# Patient Record
(demographics unavailable — no encounter records)

---

## 2025-01-10 NOTE — HISTORY OF PRESENT ILLNESS
[FreeTextEntry1] : 64f hx breast ca '06, HTN, HLD presenting for colon cancer screening. Pt denies abdominal pain, rectal bleeding, change in bowel habits, or unexplained weight loss.    Also notes heartburn frequently - takes otcs w/o any regularity but w/ triggers tomatoes, EtOH, chocolate.  No dysphagia.  Last colonoscopy: 4/2013 w/ Dr. Perdomo - small hyperplastic polyp   Soc:  no tobacco or significant EtOH FHx: no FHx GI malignancy or IBD  ROS: Constitutional:: no weight loss, fevers ENT: no deafness Eyes: not blind Neck: no LN Chest: no dyspnea/cough Cardiac: no chest pain Vascular: no leg swelling GI: no abdominal pain, nausea, vomiting, diarrhea, constipation, rectal bleeding, dysphagia, melena unless otherwise noted in HPI : no dysuria, dark urine Skin: no rashes, jaundice Heme: no bleeding Endocrine: no DM unless otherwise stated in HPI  Px: (VS noted below) General: NAD Eyes: anicteric Oropharynx:  clear Neck: no LN Chest: normal respiratory effort CVS: regular Abd: soft, NT, ND, +BS, no HSM Ext: no atrophy Neuro: grossly nonfocal  Labs/imaging/prior endoscopic results reviewed to the extent available and noted in HPI

## 2025-01-10 NOTE — ASSESSMENT
[FreeTextEntry1] : Colon cancer screening - colonoscopy due - Colonoscopy scheduled - Risks, benefits, alternatives were discussed, including but not limited to bleeding, infection, perforation and sedation risks. Additionally, the possibility of missed lesions was conveyed.  GERD - dietary and lifestyle modifications, including weight loss, smaller/frequent/earlier (>3h prior to lying down) meals, trials of cutting down/out caffeine, chocolate, tomatoes, fatty foods, alcohol. EGD in light of age, GERD sx, .  PMD/consultation/hospital notes and Labs/imaging/prior endoscopic results reviewed to extent noted in HPI; and, if procedure code billed on this visit for lab draw, this serves to signify that labs were drawn here in this office. Pt also advised that any studies ordered, if prior authorization required it may take up to a week to confirm - and if pt has not heard RE: scheduling by a week, they should call this office.

## 2025-01-10 NOTE — CONSULT LETTER
[FreeTextEntry1] : Dear Dr. BERTHA HERNANDEZ ,  I had the pleasure of evaluating your patient,  JENELLE HUMPHRIES.  Please refer to my note below.  Thank you very much for allowing me to participate in the care of this patient.  If you have any questions, please do not hesitate to contact me.  Sincerely,   Devon Alicia MD

## 2025-01-10 NOTE — REASON FOR VISIT
[Consultation] : a consultation visit [FreeTextEntry1] : Kindly asked by Dr. Rubin to consult and evaluate patient for      colon screening               A copy of this note is being sent to physician requesting consultation.

## 2025-01-16 NOTE — PHYSICAL EXAM
[Normocephalic] : normocephalic [Atraumatic] : atraumatic [Supple] : supple [No Supraclavicular Adenopathy] : no supraclavicular adenopathy [No Thyromegaly] : no thyromegaly [Normal Sinus Rhythm] : normal sinus rhythm [Examined in the supine and seated position] : examined in the supine and seated position [No dominant masses] : no dominant masses in right breast  [No dominant masses] : no dominant masses left breast [No Nipple Retraction] : no left nipple retraction [No Nipple Discharge] : no left nipple discharge [No Axillary Lymphadenopathy] : no left axillary lymphadenopathy [No Edema] : no edema [No Rashes] : no rashes [No Ulceration] : no ulceration [de-identified] : +<1cm L Post Inf Cx LN > observe [de-identified] : S/P MX/SLN/Ax/PMRT/LD w/o rec. %. No lymphedema.  [de-identified] : +FC tissue NSF

## 2025-01-16 NOTE — HISTORY OF PRESENT ILLNESS
[FreeTextEntry1] : Pt had sig episode derpession/anx () > on Effexor/Prozac/Ativan > helping. S/P R MX/Ax/Implt (06)([place]): +8.5cm ILCA, + LN, -margins, ER+, [place]+, Her2 - R Stage IIIC (T3N3M0) ILCA S/P chemo ([place]) S/P PMRT BRCA (Myriad compr)(): - S/P tmx x 5 yrs Started FemaralASAb () S/P R LD (19)([place]) +recent elevated tumor markers ([place]) > EOD w/u - > Ayaka sent > p Dx'ed w/ HT > started olmasartan (3/18)([place]) Lost 16 lbs ([employer]) Father  (3/21)(85yo, COVID) Got !! Colonoscopy(): "WNL" > 5yrs  PAP/Pelvic (): "WNL"  Had COVID () > recovered Got Pfizer booster ()(LUE) No other MH/FH changes. ROS reviewed/discussed. Taking Vit D. Last Bone Densitometry (): +osteopenia L Mammo/L Sono (24): FG, NSF CT C/A/P (3/14/24): +3cm R apical opacity c/w scar PET (24): +R apical opacity w/o uptake

## 2025-05-06 NOTE — ASSESSMENT
[FreeTextEntry1] : # +8.5cm ILCA, +14/17 LN, -margins, ER+, [place]+, Her2 - R Stage IIIC (T3N3M0) ILCA s/p R mastectomy with implant reconstruction with Dr. Dennis then s/p R LD 2/25/19 by Dr. Rebollar s/p chemo and RT followed by Dr. Reis.  Following completion of chemo/RT she completed 5 years of endocrine therapy with Tamoxifen and then transitioned to Letrozole and remains on. L Mammo/L Sono (5/30/24) MRI brain negative for intracranial mets but ? calvarial lesions tumor markers rising - no recent imaging. Check PET CT also will check signaterra to look for circulating tumor DNA continue follow up with Dr. Waldrop  #esophagitis/ reflux needs repeat EGD Cont. PPI  #osteopenia continue ca++ and vit D continue weight bearing exercise limit alcohol maintain healthy BMI 3/2025 - Femoral neck T score -1.9, Total hip t score -0.5, L spine T score -1.3  Tele in 3 weeks to review pet ct and labs

## 2025-05-06 NOTE — HISTORY OF PRESENT ILLNESS
[de-identified] :  Ms. Park is a 64 year old female with a PMHx of anxiety and depression that presents for consultation referred by breast surgery for hx of breast cancer 18 years ago.   Oncological History:   2006 s/p R mastectomy with implant reconstruction with Dr. Dennis +8.5cm ILCA, + LN, -margins, ER+, [place]+, Her2 - R Stage IIIC (T3N3M0) ILCA  s/p chemo and RT followed by Dr. Reis. Following completion of chemo/RT she completed 5 years of endocrine therapy with Tamoxifen and then transitioned to Letrozole and remains on.  s/p Myriad genetic testing  negative has not had more updated testing   S/P R LD (19)([place])  +recent elevated tumor markers  Ca15.3 - 24 --> 37.4 --> 70.9  Ca 27.29 74--> 91.2--> 170.8 Alk p 120 AST 43  2025 [facility] CTC HER2 report revealing 4.68% CTCs HER2 Pos (3+), 79.1 CTCs HER 2 Low (1+)  - results consistent with HER2 positive   MRI brain 2025 no evidence of intraparenchymal mets. No accute intracranial hemorrhage or acute infarct. Few scattered nonspecific subcentimeter enhancements in the calvarium. Given hx of breast cancer osseous mets not definitely excluded. Consider bone scan for further eval an short term follow up MRI exam recommended to ensure stability.  Health Maintenance:  Colonoscopy  and most recent  with Dr. Alicia  PAP/Pelvic  Last Bone Densitometry  +osteopenia L Mammo/L Sono 24 CT C/A/P 3/14/24 +3cm R apical opacity c/w scar PET 24+R apical opacity w/o uptake  Breast Cancer Risk Factors: Menarche: 13  Date of LMP: 46  Menopause: 46  Age at first live birth: 29 Nursed: n Hysterectomy: n Oophorectomy: n OCP: y HRT: n Last pap/pelvic exam:  Related family history no fam hx of cancer  Ashkenazi: n BRCA testin negative

## 2025-05-06 NOTE — PHYSICAL EXAM
[Fully active, able to carry on all pre-disease performance without restriction] : Status 0 - Fully active, able to carry on all pre-disease performance without restriction [Normal] : affect appropriate [de-identified] : no masses or adenopathy palpated bilaterally

## 2025-05-06 NOTE — HISTORY OF PRESENT ILLNESS
[de-identified] :  Ms. Park is a 64 year old female with a PMHx of anxiety and depression that presents for consultation referred by breast surgery for hx of breast cancer 18 years ago.   Oncological History:   2006 s/p R mastectomy with implant reconstruction with Dr. Dennis +8.5cm ILCA, + LN, -margins, ER+, CT+, Her2 - R Stage IIIC (T3N3M0) ILCA  s/p chemo and RT followed by Dr. Reis. Following completion of chemo/RT she completed 5 years of endocrine therapy with Tamoxifen and then transitioned to Letrozole and remains on.  s/p Myriad genetic testing  negative has not had more updated testing   S/P R LD (19)([place])  +recent elevated tumor markers  Ca15.3 - 24 --> 37.4 --> 70.9  Ca 27.29 74--> 91.2--> 170.8 Alk p 120 AST 43  2025 [facility] CTC HER2 report revealing 4.68% CTCs HER2 Pos (3+), 79.1 CTCs HER 2 Low (1+)  - results consistent with HER2 positive   MRI brain 2025 no evidence of intraparenchymal mets. No accute intracranial hemorrhage or acute infarct. Few scattered nonspecific subcentimeter enhancements in the calvarium. Given hx of breast cancer osseous mets not definitely excluded. Consider bone scan for further eval an short term follow up MRI exam recommended to ensure stability.  Health Maintenance:  Colonoscopy  and most recent  with Dr. Alicia  PAP/Pelvic  Last Bone Densitometry  +osteopenia L Mammo/L Sono 24 CT C/A/P 3/14/24 +3cm R apical opacity c/w scar PET 24+R apical opacity w/o uptake  Breast Cancer Risk Factors: Menarche: 13  Date of LMP: 46  Menopause: 46  Age at first live birth: 29 Nursed: n Hysterectomy: n Oophorectomy: n OCP: y HRT: n Last pap/pelvic exam:  Related family history no fam hx of cancer  Ashkenazi: n BRCA testin negative

## 2025-05-06 NOTE — ASSESSMENT
[FreeTextEntry1] : # +8.5cm ILCA, +14/17 LN, -margins, ER+, [place]+, Her2 - R Stage IIIC (T3N3M0) ILCA s/p R mastectomy with implant reconstruction with Dr. Dennis then s/p R LD 2/25/19 by Dr. Rebolalr s/p chemo and RT followed by Dr. Reis.  Following completion of chemo/RT she completed 5 years of endocrine therapy with Tamoxifen and then transitioned to Letrozole and remains on. L Mammo/L Sono (5/30/24) MRI brain negative for intracranial mets but ? calvarial lesions tumor markers rising - no recent imaging. Check PET CT also will check signaterra to look for circulating tumor DNA continue follow up with Dr. Waldrop  #esophagitis/ reflux needs repeat EGD Cont. PPI  #osteopenia continue ca++ and vit D continue weight bearing exercise limit alcohol maintain healthy BMI 3/2025 - Femoral neck T score -1.9, Total hip t score -0.5, L spine T score -1.3  Tele in 3 weeks to review pet ct and labs

## 2025-05-06 NOTE — PHYSICAL EXAM
[Fully active, able to carry on all pre-disease performance without restriction] : Status 0 - Fully active, able to carry on all pre-disease performance without restriction [Normal] : affect appropriate [de-identified] : no masses or adenopathy palpated bilaterally

## 2025-06-11 NOTE — ASSESSMENT
[FreeTextEntry1] : # +8.5cm ILCA, +14/17 LN, -margins, ER+, [place]+, Her2 - R Stage IIIC (T3N3M0) ILCA s/p R mastectomy with implant reconstruction with Dr. Dennis then s/p R LD 2/25/19 by Dr. Rebollar s/p chemo and RT followed by Dr. Reis.  Following completion of chemo/RT she completed 5 years of endocrine therapy with Tamoxifen and then transitioned to Letrozole and was on that since. L Mammo/L Sono (5/30/24) MRI brain negative for intracranial mets but ? calvarial lesions tumor markers rising - no recent imaging.  PET CT - Innumerable bone metastases, mostly involving the ribs, spine and pelvis, but also including the scapulae and the proximal femurs.  There is relatively little correlative abnormality on the accompanying CT scan although some lesions appear to be lytic. plan for ribociclib and fulvestrant as she was previously on letrozole. also will need xgeva reviewed side effect profile of all medications. she expresses understanding.  dental clearance prior to xgeva. she is seeing them on 6/30 check guardant 360 to look for ESR1 mutation check foundation to look for targetable mutations  #esophagitis/ reflux needs repeat EGD Cont. PPI  #osteopenia continue ca++ and vit D continue weight bearing exercise limit alcohol maintain healthy BMI 3/2025 - Femoral neck T score -1.9, Total hip t score -0.5, L spine T score -1.3 will be on xgeva for bone mets

## 2025-06-11 NOTE — HISTORY OF PRESENT ILLNESS
[de-identified] :  Ms. Park is a 64 year old female with a PMHx of anxiety and depression that presents for consultation referred by breast surgery for hx of breast cancer 18 years ago.   Oncological History:   2006 s/p R mastectomy with implant reconstruction with Dr. Dennis +8.5cm ILCA, + LN, -margins, ER+, [place]+, Her2 - R Stage IIIC (T3N3M0) ILCA  s/p chemo and RT followed by Dr. Reis. Following completion of chemo/RT she completed 5 years of endocrine therapy with Tamoxifen and then transitioned to Letrozole and remains on.  s/p Myriad genetic testing  negative has not had more updated testing   S/P R LD (19)([place])  +recent elevated tumor markers  Ca15.3 - 24 --> 37.4 --> 70.9  Ca 27.29 74--> 91.2--> 170.8 Alk p 120 AST 43  2025 [facility] CTC HER2 report revealing 4.68% CTCs HER2 Pos (3+), 79.1 CTCs HER 2 Low (1+)  - results consistent with HER2 positive   MRI brain 2025 no evidence of intraparenchymal mets. No accute intracranial hemorrhage or acute infarct. Few scattered nonspecific subcentimeter enhancements in the calvarium. Given hx of breast cancer osseous mets not definitely excluded. Consider bone scan for further eval an short term follow up MRI exam recommended to ensure stability.  Health Maintenance:  Colonoscopy  and most recent  with Dr. Alicia  PAP/Pelvic  Last Bone Densitometry  +osteopenia L Mammo/L Sono 24 CT C/A/P 3/14/24 +3cm R apical opacity c/w scar PET 24+R apical opacity w/o uptake  Breast Cancer Risk Factors: Menarche: 13  Date of LMP: 46  Menopause: 46  Age at first live birth: 29 Nursed: n Hysterectomy: n Oophorectomy: n OCP: y HRT: n Last pap/pelvic exam:  Related family history no fam hx of cancer  Ashkenazi: n BRCA testin negative  [de-identified] : s/p biopsy revealing ER/[place]+ HER2- metastatic disease to bones no complaints of pain

## 2025-06-11 NOTE — CONSULT LETTER
[Dear  ___] : Dear  [unfilled], [Please see my note below.] : Please see my note below. [Consult Letter:] : I had the pleasure of evaluating your patient, [unfilled]. [Consult Closing:] : Thank you very much for allowing me to participate in the care of this patient.  If you have any questions, please do not hesitate to contact me. [Sincerely,] : Sincerely, [FreeTextEntry3] : Yumiko Hall DO, FACGRISEL, FACP Medical Oncology and Hematology [facility]

## 2025-06-11 NOTE — HISTORY OF PRESENT ILLNESS
[de-identified] :  Ms. Park is a 64 year old female with a PMHx of anxiety and depression that presents for consultation referred by breast surgery for hx of breast cancer 18 years ago.   Oncological History:   2006 s/p R mastectomy with implant reconstruction with Dr. Dennis +8.5cm ILCA, + LN, -margins, ER+, [place]+, Her2 - R Stage IIIC (T3N3M0) ILCA  s/p chemo and RT followed by Dr. Reis. Following completion of chemo/RT she completed 5 years of endocrine therapy with Tamoxifen and then transitioned to Letrozole and remains on.  s/p Myriad genetic testing  negative has not had more updated testing   S/P R LD (19)([place])  +recent elevated tumor markers  Ca15.3 - 24 --> 37.4 --> 70.9  Ca 27.29 74--> 91.2--> 170.8 Alk p 120 AST 43  2025 [facility] CTC HER2 report revealing 4.68% CTCs HER2 Pos (3+), 79.1 CTCs HER 2 Low (1+)  - results consistent with HER2 positive   MRI brain 2025 no evidence of intraparenchymal mets. No accute intracranial hemorrhage or acute infarct. Few scattered nonspecific subcentimeter enhancements in the calvarium. Given hx of breast cancer osseous mets not definitely excluded. Consider bone scan for further eval an short term follow up MRI exam recommended to ensure stability.  Health Maintenance:  Colonoscopy  and most recent  with Dr. Alicia  PAP/Pelvic  Last Bone Densitometry  +osteopenia L Mammo/L Sono 24 CT C/A/P 3/14/24 +3cm R apical opacity c/w scar PET 24+R apical opacity w/o uptake  Breast Cancer Risk Factors: Menarche: 13  Date of LMP: 46  Menopause: 46  Age at first live birth: 29 Nursed: n Hysterectomy: n Oophorectomy: n OCP: y HRT: n Last pap/pelvic exam:  Related family history no fam hx of cancer  Ashkenazi: n BRCA testin negative  [de-identified] : s/p biopsy revealing ER/LA+ HER2- metastatic disease to bones no complaints of pain

## 2025-06-11 NOTE — PHYSICAL EXAM
[Fully active, able to carry on all pre-disease performance without restriction] : Status 0 - Fully active, able to carry on all pre-disease performance without restriction [Normal] : affect appropriate [de-identified] : no masses or adenopathy palpated bilaterally

## 2025-06-11 NOTE — ASSESSMENT
[FreeTextEntry1] : # +8.5cm ILCA, +14/17 LN, -margins, ER+, [place]+, Her2 - R Stage IIIC (T3N3M0) ILCA s/p R mastectomy with implant reconstruction with Dr. Dennis then s/p R LD 2/25/19 by Dr. Rebollar s/p chemo and RT followed by Dr. Reis.  Following completion of chemo/RT she completed 5 years of endocrine therapy with Tamoxifen and then transitioned to Letrozole and was on that since. L Mammo/L Sono (5/30/24) MRI brain negative for intracranial mets but ? calvarial lesions tumor markers rising - no recent imaging.  PET CT - Innumerable bone metastases, mostly involving the ribs, spine and pelvis, but also including the scapulae and the proximal femurs.  There is relatively little correlative abnormality on the accompanying CT scan although some lesions appear to be lytic. plan for ribociclib and fulvestrant as she was previously on letrozole. also will need xgeva reviewed side effect profile of all medications. she expresses understanding.  dental clearance prior to xgeva. she is seeing them on 6/30 check guardant 360 to look for ESR1 mutation check foundation to look for targetable mutations  #esophagitis/ reflux needs repeat EGD Cont. PPI  #osteopenia continue ca++ and vit D continue weight bearing exercise limit alcohol maintain healthy BMI 3/2025 - Femoral neck T score -1.9, Total hip t score -0.5, L spine T score -1.3 will be on xgeva for bone mets

## 2025-06-11 NOTE — PHYSICAL EXAM
[Fully active, able to carry on all pre-disease performance without restriction] : Status 0 - Fully active, able to carry on all pre-disease performance without restriction [Normal] : affect appropriate [de-identified] : no masses or adenopathy palpated bilaterally

## 2025-06-11 NOTE — ASSESSMENT
[FreeTextEntry1] : # +8.5cm ILCA, +14/17 LN, -margins, ER+, [place]+, Her2 - R Stage IIIC (T3N3M0) ILCA s/p R mastectomy with implant reconstruction with Dr. Dennis then s/p R LD 2/25/19 by Dr. Rebollar s/p chemo and RT followed by Dr. Reis.  Following completion of chemo/RT she completed 5 years of endocrine therapy with Tamoxifen and then transitioned to Letrozole and was on that since. L Mammo/L Sono (5/30/24) MRI brain negative for intracranial mets but ? calvarial lesions tumor markers rising - no recent imaging.  PET CT - Innumerable bone metastases, mostly involving the ribs, spine and pelvis, but also including the scapulae and the proximal femurs.  There is relatively little correlative abnormality on the accompanying CT scan although some lesions appear to be lytic. plan for ribociclib and fulvestrant as she was previously on letrozole. also will need xgeva reviewed side effect profile of all medications. she expresses understanding.  dental clearance prior to xgeva. she is seeing them on 6/30 check guardant 360 to look for ESR1 mutation check foundation to look for targetable mutations  #esophagitis/ reflux needs repeat EGD Cont. PPI  #osteopenia continue ca++ and vit D continue weight bearing exercise limit alcohol maintain healthy BMI 3/2025 - Femoral neck T score -1.9, Total hip t score -0.5, L spine T score -1.3 will be on xgeva for bone mets

## 2025-06-11 NOTE — CONSULT LETTER
[Dear  ___] : Dear  [unfilled], [Consult Letter:] : I had the pleasure of evaluating your patient, [unfilled]. [Please see my note below.] : Please see my note below. [Consult Closing:] : Thank you very much for allowing me to participate in the care of this patient.  If you have any questions, please do not hesitate to contact me. [Sincerely,] : Sincerely, [FreeTextEntry3] : Yumiko Hall DO, FACGRISEL, FACP Medical Oncology and Hematology [facility]

## 2025-06-11 NOTE — HISTORY OF PRESENT ILLNESS
[de-identified] :  Ms. Park is a 64 year old female with a PMHx of anxiety and depression that presents for consultation referred by breast surgery for hx of breast cancer 18 years ago.   Oncological History:   2006 s/p R mastectomy with implant reconstruction with Dr. Dennis +8.5cm ILCA, + LN, -margins, ER+, [place]+, Her2 - R Stage IIIC (T3N3M0) ILCA  s/p chemo and RT followed by Dr. Reis. Following completion of chemo/RT she completed 5 years of endocrine therapy with Tamoxifen and then transitioned to Letrozole and remains on.  s/p Myriad genetic testing  negative has not had more updated testing   S/P R LD (19)([place])  +recent elevated tumor markers  Ca15.3 - 24 --> 37.4 --> 70.9  Ca 27.29 74--> 91.2--> 170.8 Alk p 120 AST 43  2025 [facility] CTC HER2 report revealing 4.68% CTCs HER2 Pos (3+), 79.1 CTCs HER 2 Low (1+)  - results consistent with HER2 positive   MRI brain 2025 no evidence of intraparenchymal mets. No accute intracranial hemorrhage or acute infarct. Few scattered nonspecific subcentimeter enhancements in the calvarium. Given hx of breast cancer osseous mets not definitely excluded. Consider bone scan for further eval an short term follow up MRI exam recommended to ensure stability.  Health Maintenance:  Colonoscopy  and most recent  with Dr. Alicia  PAP/Pelvic  Last Bone Densitometry  +osteopenia L Mammo/L Sono 24 CT C/A/P 3/14/24 +3cm R apical opacity c/w scar PET 24+R apical opacity w/o uptake  Breast Cancer Risk Factors: Menarche: 13  Date of LMP: 46  Menopause: 46  Age at first live birth: 29 Nursed: n Hysterectomy: n Oophorectomy: n OCP: y HRT: n Last pap/pelvic exam:  Related family history no fam hx of cancer  Ashkenazi: n BRCA testin negative  [de-identified] : s/p biopsy revealing ER/[place]+ HER2- metastatic disease to bones no complaints of pain

## 2025-06-11 NOTE — CONSULT LETTER
[Dear  ___] : Dear  [unfilled], [Please see my note below.] : Please see my note below. [Consult Letter:] : I had the pleasure of evaluating your patient, [unfilled]. [Consult Closing:] : Thank you very much for allowing me to participate in the care of this patient.  If you have any questions, please do not hesitate to contact me. [Sincerely,] : Sincerely, [FreeTextEntry3] : Yumiko Hall DO, FACGRISEL, FACP Medical Oncology and Hematology [facility]

## 2025-06-11 NOTE — PHYSICAL EXAM
[Fully active, able to carry on all pre-disease performance without restriction] : Status 0 - Fully active, able to carry on all pre-disease performance without restriction [Normal] : affect appropriate [de-identified] : no masses or adenopathy palpated bilaterally

## 2025-06-25 NOTE — BEGINNING OF VISIT
[0] : 2) Feeling down, depressed, or hopeless: Not at all (0) [[id number]] : 0 [Never] : Never [Date Discussed (MM/DD/YY): ___] : Discussed: [unfilled] [Patient/Caregiver not ready to engage] : Patient/Caregiver not ready to engage

## 2025-06-25 NOTE — BEGINNING OF VISIT
[0] : 2) Feeling down, depressed, or hopeless: Not at all (0) [[id number]] : 0 [Never] : Never [Date Discussed (MM/DD/YY): ___] : Discussed: [unfilled] [Patient/Caregiver not ready to engage] : Patient/Caregiver not ready to engage

## 2025-06-25 NOTE — BEGINNING OF VISIT
[0] : 2) Feeling down, depressed, or hopeless: Not at all (0) [[id number]] : 0 [Never] : Never [Date Discussed (MM/DD/YY): ___] : Discussed: [unfilled] [Patient/Caregiver not ready to engage] : Patient/Caregiver not ready to engage

## 2025-06-25 NOTE — BEGINNING OF VISIT
[0] : 2) Feeling down, depressed, or hopeless: Not at all (0) [[id number]] : 0 [Never] : Never [Date Discussed (MM/DD/YY): ___] : Discussed: [unfilled] [Patient/Caregiver not ready to engage] : Patient/Caregiver not ready to engage

## 2025-07-01 NOTE — PHYSICAL EXAM
[Fully active, able to carry on all pre-disease performance without restriction] : Status 0 - Fully active, able to carry on all pre-disease performance without restriction [Normal] : affect appropriate [de-identified] : no masses or adenopathy palpated bilaterally

## 2025-07-01 NOTE — ASSESSMENT
[FreeTextEntry1] : # +8.5cm ILCA, +14/17 LN, -margins, ER+, [place]+, Her2 - R Stage IIIC (T3N3M0) ILCA s/p R mastectomy with implant reconstruction with Dr. Dennis then s/p R LD 2/25/19 by Dr. Rebollar s/p chemo and RT followed by Dr. Reis.  Following completion of chemo/RT she completed 5 years of endocrine therapy with Tamoxifen and then transitioned to Letrozole and was on that since. L Mammo/L Sono (5/30/24) MRI brain negative for intracranial mets but ? calvarial lesions tumor markers rising - no recent imaging.  PET CT - Innumerable bone metastases, mostly involving the ribs, spine and pelvis, but also including the scapulae and the proximal femurs.  There is relatively little correlative abnormality on the accompanying CT scan although some lesions appear to be lytic. plan for ribociclib and fulvestrant as she was previously on letrozole. also will need xgeva reviewed side effect profile of all medications. she expresses understanding.  dental clearance prior to xgeva. she is seeing them on 6/30 + ESR1 mutation and PI3KCA  If POD can try PI3KCA inbitor with alpelisib or capivasertib + fulvestrant or elacestrant to target ESR1 spoke with Dr. Reis to update him on case. She will be seeing him for his opinion today  #esophagitis/ reflux needs repeat EGD Cont. PPI  #osteopenia continue ca++ and vit D continue weight bearing exercise limit alcohol maintain healthy BMI 3/2025 - Femoral neck T score -1.9, Total hip t score -0.5, L spine T score -1.3 will be on xgeva for bone mets  #cancer related pain will have RT evaluate for radiation to ribs continue pain control as needed  RTC in 2 weeks cbc with diff, cmp, iron, ferritin, b12, folate, esr, crp, LDH, signatera

## 2025-07-01 NOTE — CONSULT LETTER
[Dear  ___] : Dear  [unfilled], [Consult Letter:] : I had the pleasure of evaluating your patient, [unfilled]. [Please see my note below.] : Please see my note below. [Consult Closing:] : Thank you very much for allowing me to participate in the care of this patient.  If you have any questions, please do not hesitate to contact me. [Sincerely,] : Sincerely, [FreeTextEntry3] : Yumiko Hall DO, FACGRISEL, FACP Medical Oncology and Hematology [facility]

## 2025-07-01 NOTE — HISTORY OF PRESENT ILLNESS
[de-identified] :  Ms. Park is a 64 year old female with a PMHx of anxiety and depression that presents for consultation referred by breast surgery for hx of breast cancer 18 years ago.   Oncological History:   2006 s/p R mastectomy with implant reconstruction with Dr. Dennis +8.5cm ILCA, + LN, -margins, ER+, [place]+, Her2 - R Stage IIIC (T3N3M0) ILCA  s/p chemo and RT followed by Dr. Reis. Following completion of chemo/RT she completed 5 years of endocrine therapy with Tamoxifen and then transitioned to Letrozole and remains on.  s/p Myriad genetic testing  negative has not had more updated testing   S/P R LD (19)([place])  +recent elevated tumor markers  Ca15.3 - 24 --> 37.4 --> 70.9  Ca 27.29 74--> 91.2--> 170.8 Alk p 120 AST 43  2025 [facility] CTC HER2 report revealing 4.68% CTCs HER2 Pos (3+), 79.1 CTCs HER 2 Low (1+)  - results consistent with HER2 positive   MRI brain 2025 no evidence of intraparenchymal mets. No accute intracranial hemorrhage or acute infarct. Few scattered nonspecific subcentimeter enhancements in the calvarium. Given hx of breast cancer osseous mets not definitely excluded. Consider bone scan for further eval an short term follow up MRI exam recommended to ensure stability.  Health Maintenance:  Colonoscopy  and most recent  with Dr. Alicia  PAP/Pelvic  Last Bone Densitometry  +osteopenia L Mammo/L Sono 24 CT C/A/P 3/14/24 +3cm R apical opacity c/w scar PET 24+R apical opacity w/o uptake  Breast Cancer Risk Factors: Menarche: 13  Date of LMP: 46  Menopause: 46  Age at first live birth: 29 Nursed: n Hysterectomy: n Oophorectomy: n OCP: y HRT: n Last pap/pelvic exam:  Related family history no fam hx of cancer  Ashkenazi: n BRCA testin negative  [de-identified] : s/p biopsy revealing ER/SD+ HER2- metastatic disease to bones complains of rib pain started ribociclib. continues to receive fulvestrant

## 2025-07-01 NOTE — PHYSICAL EXAM
[Fully active, able to carry on all pre-disease performance without restriction] : Status 0 - Fully active, able to carry on all pre-disease performance without restriction [Normal] : affect appropriate [de-identified] : no masses or adenopathy palpated bilaterally

## 2025-07-01 NOTE — ASSESSMENT
[FreeTextEntry1] : # +8.5cm ILCA, +14/17 LN, -margins, ER+, [place]+, Her2 - R Stage IIIC (T3N3M0) ILCA s/p R mastectomy with implant reconstruction with Dr. Dennis then s/p R LD 2/25/19 by Dr. Rebollar s/p chemo and RT followed by Dr. Reis.  Following completion of chemo/RT she completed 5 years of endocrine therapy with Tamoxifen and then transitioned to Letrozole and was on that since. L Mammo/L Sono (5/30/24) MRI brain negative for intracranial mets but ? calvarial lesions tumor markers rising - no recent imaging.  PET CT - Innumerable bone metastases, mostly involving the ribs, spine and pelvis, but also including the scapulae and the proximal femurs.  There is relatively little correlative abnormality on the accompanying CT scan although some lesions appear to be lytic. plan for ribociclib and fulvestrant as she was previously on letrozole. also will need xgeva reviewed side effect profile of all medications. she expresses understanding.  dental clearance prior to xgeva. she is seeing them on 6/30 + ESR1 mutation and PI3KCA  If POD can try PI3KCA inbitor with alpelisib or capivasertib + fulvestrant or elacestrant to target ESR1 spoke with Dr. Reis to update him on case. She will be seeing him for his opinion today  #esophagitis/ reflux needs repeat EGD Cont. PPI  #osteopenia continue ca++ and vit D continue weight bearing exercise limit alcohol maintain healthy BMI 3/2025 - Femoral neck T score -1.9, Total hip t score -0.5, L spine T score -1.3 will be on xgeva for bone mets  #cancer related pain will have RT evaluate for radiation to ribs continue pain control as needed  RTC in 2 weeks cbc with diff, cmp, iron, ferritin, b12, folate, esr, crp, LDH, signatera

## 2025-07-01 NOTE — PHYSICAL EXAM
[Fully active, able to carry on all pre-disease performance without restriction] : Status 0 - Fully active, able to carry on all pre-disease performance without restriction [Normal] : affect appropriate [de-identified] : no masses or adenopathy palpated bilaterally

## 2025-07-01 NOTE — ASSESSMENT
[FreeTextEntry1] : # +8.5cm ILCA, +14/17 LN, -margins, ER+, [place]+, Her2 - R Stage IIIC (T3N3M0) ILCA s/p R mastectomy with implant reconstruction with Dr. Dennis then s/p R LD 2/25/19 by Dr. Rebollar s/p chemo and RT followed by Dr. Reis.  Following completion of chemo/RT she completed 5 years of endocrine therapy with Tamoxifen and then transitioned to Letrozole and was on that since. L Mammo/L Sono (5/30/24) MRI brain negative for intracranial mets but ? calvarial lesions tumor markers rising - no recent imaging.  PET CT - Innumerable bone metastases, mostly involving the ribs, spine and pelvis, but also including the scapulae and the proximal femurs.  There is relatively little correlative abnormality on the accompanying CT scan although some lesions appear to be lytic. plan for ribociclib and fulvestrant as she was previously on letrozole. also will need xgeva reviewed side effect profile of all medications. she expresses understanding.  dental clearance prior to xgeva. she is seeing them on 6/30 + ESR1 mutation and PI3KCA  If POD can try PI3KCA inbitor with alpelisib or capivasertib + fulvestrant or elacestrant to target ESR1 spoke with Dr. Reis to update him on case. She will be seeing him for his opinion today  #esophagitis/ reflux needs repeat EGD Cont. PPI  #osteopenia continue ca++ and vit D continue weight bearing exercise limit alcohol maintain healthy BMI 3/2025 - Femoral neck T score -1.9, Total hip t score -0.5, L spine T score -1.3 will be on xgeva for bone mets  #cancer related pain will have RT evaluate for radiation to ribs continue pain control as needed  RTC in 2 weeks cbc with diff, cmp, iron, ferritin, b12, folate, esr, crp, LDH, signatera

## 2025-07-01 NOTE — CONSULT LETTER
[Dear  ___] : Dear  [unfilled], [Consult Letter:] : I had the pleasure of evaluating your patient, [unfilled]. [Please see my note below.] : Please see my note below. [Consult Closing:] : Thank you very much for allowing me to participate in the care of this patient.  If you have any questions, please do not hesitate to contact me. [Sincerely,] : Sincerely, [FreeTextEntry3] : Yumiko Hall DO, FACGRISEL, FACP Medical Oncology and Hematology [facility]

## 2025-07-01 NOTE — HISTORY OF PRESENT ILLNESS
[de-identified] :  Ms. Park is a 64 year old female with a PMHx of anxiety and depression that presents for consultation referred by breast surgery for hx of breast cancer 18 years ago.   Oncological History:   2006 s/p R mastectomy with implant reconstruction with Dr. Dennis +8.5cm ILCA, + LN, -margins, ER+, [place]+, Her2 - R Stage IIIC (T3N3M0) ILCA  s/p chemo and RT followed by Dr. Reis. Following completion of chemo/RT she completed 5 years of endocrine therapy with Tamoxifen and then transitioned to Letrozole and remains on.  s/p Myriad genetic testing  negative has not had more updated testing   S/P R LD (19)([place])  +recent elevated tumor markers  Ca15.3 - 24 --> 37.4 --> 70.9  Ca 27.29 74--> 91.2--> 170.8 Alk p 120 AST 43  2025 [facility] CTC HER2 report revealing 4.68% CTCs HER2 Pos (3+), 79.1 CTCs HER 2 Low (1+)  - results consistent with HER2 positive   MRI brain 2025 no evidence of intraparenchymal mets. No accute intracranial hemorrhage or acute infarct. Few scattered nonspecific subcentimeter enhancements in the calvarium. Given hx of breast cancer osseous mets not definitely excluded. Consider bone scan for further eval an short term follow up MRI exam recommended to ensure stability.  Health Maintenance:  Colonoscopy  and most recent  with Dr. Alicia  PAP/Pelvic  Last Bone Densitometry  +osteopenia L Mammo/L Sono 24 CT C/A/P 3/14/24 +3cm R apical opacity c/w scar PET 24+R apical opacity w/o uptake  Breast Cancer Risk Factors: Menarche: 13  Date of LMP: 46  Menopause: 46  Age at first live birth: 29 Nursed: n Hysterectomy: n Oophorectomy: n OCP: y HRT: n Last pap/pelvic exam:  Related family history no fam hx of cancer  Ashkenazi: n BRCA testin negative  [de-identified] : s/p biopsy revealing ER/IN+ HER2- metastatic disease to bones complains of rib pain started ribociclib. continues to receive fulvestrant

## 2025-07-01 NOTE — HISTORY OF PRESENT ILLNESS
[de-identified] :  Ms. Park is a 64 year old female with a PMHx of anxiety and depression that presents for consultation referred by breast surgery for hx of breast cancer 18 years ago.   Oncological History:   2006 s/p R mastectomy with implant reconstruction with Dr. Dennis +8.5cm ILCA, + LN, -margins, ER+, [place]+, Her2 - R Stage IIIC (T3N3M0) ILCA  s/p chemo and RT followed by Dr. Reis. Following completion of chemo/RT she completed 5 years of endocrine therapy with Tamoxifen and then transitioned to Letrozole and remains on.  s/p Myriad genetic testing  negative has not had more updated testing   S/P R LD (19)([place])  +recent elevated tumor markers  Ca15.3 - 24 --> 37.4 --> 70.9  Ca 27.29 74--> 91.2--> 170.8 Alk p 120 AST 43  2025 [facility] CTC HER2 report revealing 4.68% CTCs HER2 Pos (3+), 79.1 CTCs HER 2 Low (1+)  - results consistent with HER2 positive   MRI brain 2025 no evidence of intraparenchymal mets. No accute intracranial hemorrhage or acute infarct. Few scattered nonspecific subcentimeter enhancements in the calvarium. Given hx of breast cancer osseous mets not definitely excluded. Consider bone scan for further eval an short term follow up MRI exam recommended to ensure stability.  Health Maintenance:  Colonoscopy  and most recent  with Dr. Alicia  PAP/Pelvic  Last Bone Densitometry  +osteopenia L Mammo/L Sono 24 CT C/A/P 3/14/24 +3cm R apical opacity c/w scar PET 24+R apical opacity w/o uptake  Breast Cancer Risk Factors: Menarche: 13  Date of LMP: 46  Menopause: 46  Age at first live birth: 29 Nursed: n Hysterectomy: n Oophorectomy: n OCP: y HRT: n Last pap/pelvic exam:  Related family history no fam hx of cancer  Ashkenazi: n BRCA testin negative  [de-identified] : s/p biopsy revealing ER/ND+ HER2- metastatic disease to bones complains of rib pain started ribociclib. continues to receive fulvestrant

## 2025-07-01 NOTE — PHYSICAL EXAM
[Fully active, able to carry on all pre-disease performance without restriction] : Status 0 - Fully active, able to carry on all pre-disease performance without restriction [Normal] : affect appropriate [de-identified] : no masses or adenopathy palpated bilaterally

## 2025-07-01 NOTE — CONSULT LETTER
[Dear  ___] : Dear  [unfilled], [Consult Letter:] : I had the pleasure of evaluating your patient, [unfilled]. [Please see my note below.] : Please see my note below. [Consult Closing:] : Thank you very much for allowing me to participate in the care of this patient.  If you have any questions, please do not hesitate to contact me. [Sincerely,] : Sincerely, [FreeTextEntry3] : Yumiko Hall DO, FACGRISEL, FACP Medical Oncology and Hematology [facility]

## 2025-07-01 NOTE — HISTORY OF PRESENT ILLNESS
[de-identified] :  Ms. Park is a 64 year old female with a PMHx of anxiety and depression that presents for consultation referred by breast surgery for hx of breast cancer 18 years ago.   Oncological History:   2006 s/p R mastectomy with implant reconstruction with Dr. Dennis +8.5cm ILCA, + LN, -margins, ER+, [place]+, Her2 - R Stage IIIC (T3N3M0) ILCA  s/p chemo and RT followed by Dr. Reis. Following completion of chemo/RT she completed 5 years of endocrine therapy with Tamoxifen and then transitioned to Letrozole and remains on.  s/p Myriad genetic testing  negative has not had more updated testing   S/P R LD (19)([place])  +recent elevated tumor markers  Ca15.3 - 24 --> 37.4 --> 70.9  Ca 27.29 74--> 91.2--> 170.8 Alk p 120 AST 43  2025 [facility] CTC HER2 report revealing 4.68% CTCs HER2 Pos (3+), 79.1 CTCs HER 2 Low (1+)  - results consistent with HER2 positive   MRI brain 2025 no evidence of intraparenchymal mets. No accute intracranial hemorrhage or acute infarct. Few scattered nonspecific subcentimeter enhancements in the calvarium. Given hx of breast cancer osseous mets not definitely excluded. Consider bone scan for further eval an short term follow up MRI exam recommended to ensure stability.  Health Maintenance:  Colonoscopy  and most recent  with Dr. Alicia  PAP/Pelvic  Last Bone Densitometry  +osteopenia L Mammo/L Sono 24 CT C/A/P 3/14/24 +3cm R apical opacity c/w scar PET 24+R apical opacity w/o uptake  Breast Cancer Risk Factors: Menarche: 13  Date of LMP: 46  Menopause: 46  Age at first live birth: 29 Nursed: n Hysterectomy: n Oophorectomy: n OCP: y HRT: n Last pap/pelvic exam:  Related family history no fam hx of cancer  Ashkenazi: n BRCA testin negative  [de-identified] : s/p biopsy revealing ER/SC+ HER2- metastatic disease to bones complains of rib pain started ribociclib. continues to receive fulvestrant

## 2025-07-01 NOTE — CONSULT LETTER
[Dear  ___] : Dear  [unfilled], [Consult Letter:] : I had the pleasure of evaluating your patient, [unfilled]. [Please see my note below.] : Please see my note below. [Consult Closing:] : Thank you very much for allowing me to participate in the care of this patient.  If you have any questions, please do not hesitate to contact me. [Sincerely,] : Sincerely, [FreeTextEntry3] : Yumiko Hall DO, FACGRISEL, FACP Medical Oncology and Hematology [facility]

## 2025-07-01 NOTE — ASSESSMENT
[FreeTextEntry1] : # +8.5cm ILCA, +14/17 LN, -margins, ER+, [place]+, Her2 - R Stage IIIC (T3N3M0) ILCA s/p R mastectomy with implant reconstruction with Dr. Dennis then s/p R LD 2/25/19 by Dr. Rebollar s/p chemo and RT followed by Dr. Reis.  Following completion of chemo/RT she completed 5 years of endocrine therapy with Tamoxifen and then transitioned to Letrozole and was on that since. L Mammo/L Sono (5/30/24) MRI brain negative for intracranial mets but ? calvarial lesions tumor markers rising - no recent imaging.  PET CT - Innumerable bone metastases, mostly involving the ribs, spine and pelvis, but also including the scapulae and the proximal femurs.  There is relatively little correlative abnormality on the accompanying CT scan although some lesions appear to be lytic. plan for ribociclib and fulvestrant as she was previously on letrozole. also will need xgeva reviewed side effect profile of all medications. she expresses understanding.  dental clearance prior to xgeva. she is seeing them on 6/30 + ESR1 mutation and PI3KCA  If POD can try PI3KCA inbitor with alpelisib or capivasertib + fulvestrant or elacestrant to target ESR1 spoke with Dr. Reis to update him on case. She will be seeing him for his opinion today  #esophagitis/ reflux needs repeat EGD Cont. PPI  #osteopenia continue ca++ and vit D continue weight bearing exercise limit alcohol maintain healthy BMI 3/2025 - Femoral neck T score -1.9, Total hip t score -0.5, L spine T score -1.3 will be on xgeva for bone mets  #cancer related pain will have RT evaluate for radiation to ribs continue pain control as needed  RTC in 2 weeks cbc with diff, cmp, iron, ferritin, b12, folate, esr, crp, LDH, signatera

## 2025-07-02 NOTE — VITALS
[Maximal Pain Intensity: 8/10] : 8/10 [Least Pain Intensity: 1/10] : 1/10 [Pain Location: ___] : Pain Location: [unfilled] [OTC] : OTC [90: Able to carry normal activity; minor signs or symptoms of disease.] : 90: Able to carry normal activity; minor signs or symptoms of disease.  [ECOG Performance Status: 1 - Restricted in physically strenuous activity but ambulatory and able to carry out work of a light or sedentary nature] : Performance Status: 1 - Restricted in physically strenuous activity but ambulatory and able to carry out work of a light or sedentary nature, e.g., light house work, office work [Date: ____________] : Patient's last distress assessment performed on [unfilled]. [8 - Distress Level] : Distress Level: 8

## 2025-07-07 NOTE — PHYSICAL EXAM
[Normal] : no joint swelling, no clubbing or cyanosis of the fingernails and muscle strength and tone were normal [de-identified] : s/p right MRM 2006, s/p right reconstruction 2019

## 2025-07-07 NOTE — PHYSICAL EXAM
[Normal] : no joint swelling, no clubbing or cyanosis of the fingernails and muscle strength and tone were normal [de-identified] : s/p right MRM 2006, s/p right reconstruction 2019

## 2025-07-07 NOTE — HISTORY OF PRESENT ILLNESS
[FreeTextEntry1] : Ms. Park is a 64 year old female, diagnosed with metastatic breast cancer, presenting with osseous metastasis.   HPI: Carcinoma of right breast metastatic to axillary lymph node, s/p right mastectomy with implant reconstruction with Dr. Dennis then s/p right lymph node dissection in 2006. Pathology: +8.5cm ILCA, +14/17 LN, -margins, ER+, [place]+, Her2 - R Stage IIIC (T3N3M0) ILCA. She is s/p chemo and RT ([facility]) under the care of Dr. Reis and completed 5 years of endocrine therapy. In 2/25/19, she underwent breast reconstruction by Dr. Rebollar.  MRI brain (4/4/25) - No evidence of intraparenchymal metastasis. Few nonspecific sub centimeter enhancements in the calvarium. Osseous metastases not definitely excluded.   PET CT (05/20/25):  1.  Innumerable bone metastases, mostly involving the ribs, spine and pelvis, but also including the scapulae and the proximal femurs.  There was relatively little correlative abnormality on the accompanying CT scan although some lesions appeared to be lytic.  2.  Enlargement of the spleen compared to the prior study.  The overall size of the spleen is near the upper limit of normal.  Ms. Park underwent biopsy of the left pubic symphysis on 6/5/25, pathology revealed: A. LEFT PUBIC SYMPHYSIS: -Metastatic carcinoma, compatible with known breast primary -Estrogen Receptor (ER): Positive (80%, moderate intensity) -Progesterone Receptor (PgR): Positive (60%, moderate intensity) -HER-2 Negative (Score 0) -Ki67 positive staining nuclei: 30-40%  Ms. Park reports right sided rib pain, rating it an 8 / 10 on the pain scale today.  On average, pain is approximately 1 / 10 on the pain scale. She reports pain intensifies with daily activity and noted that she had spent the prior day gardening. She also reports sacral discomfort that is relieved with Advil.   Ms. Park was started on Faslodex, Kisqali, and soon she will start Xgeva pending dental clearance.   Ms. Park is present to discuss palliative radiation therapy.

## 2025-07-07 NOTE — PHYSICAL EXAM
[Normal] : no joint swelling, no clubbing or cyanosis of the fingernails and muscle strength and tone were normal [de-identified] : s/p right MRM 2006, s/p right reconstruction 2019

## 2025-07-07 NOTE — HISTORY OF PRESENT ILLNESS
[FreeTextEntry1] : Ms. Park is a 64 year old female, diagnosed with metastatic breast cancer, presenting with osseous metastasis.   HPI: Carcinoma of right breast metastatic to axillary lymph node, s/p right mastectomy with implant reconstruction with Dr. Dennis then s/p right lymph node dissection in 2006. Pathology: +8.5cm ILCA, +14/17 LN, -margins, ER+, [place]+, Her2 - R Stage IIIC (T3N3M0) ILCA. She is s/p chemo and RT ([facility]) under the care of Dr. Reis and completed 5 years of endocrine therapy. In 2/25/19, she underwent breast reconstruction by Dr. Rebollar.  MRI brain (4/4/25) - No evidence of intraparenchymal metastasis. Few nonspecific sub centimeter enhancements in the calvarium. Osseous metastases not definitely excluded.   PET CT (05/20/25):  1.  Innumerable bone metastases, mostly involving the ribs, spine and pelvis, but also including the scapulae and the proximal femurs.  There was relatively little correlative abnormality on the accompanying CT scan although some lesions appeared to be lytic.  2.  Enlargement of the spleen compared to the prior study.  The overall size of the spleen is near the upper limit of normal.  Ms. Park underwent biopsy of the left pubic symphysis on 6/5/25, pathology revealed: A. LEFT PUBIC SYMPHYSIS: -Metastatic carcinoma, compatible with known breast primary -Estrogen Receptor (ER): Positive (80%, moderate intensity) -Progesterone Receptor (PgR): Positive (60%, moderate intensity) -HER-2 Negative (Score 0) -Ki67 positive staining nuclei: 30-40%  Ms. Park reports right sided rib pain, rating it an 8 / 10 on the pain scale today.  On average, pain is approximately 1 / 10 on the pain scale. She reports pain intensifies with daily activity and noted that she had spent the prior day gardening. She also reports sacral discomfort that is relieved with Advil.   Ms. Park was started on Faslodex, Kisqali, and soon she will start Xgeva pending dental clearance.   Ms. Park is present to discuss palliative radiation therapy.

## 2025-07-07 NOTE — HISTORY OF PRESENT ILLNESS
[FreeTextEntry1] : Ms. Park is a 64 year old female, diagnosed with metastatic breast cancer, presenting with osseous metastasis.   HPI: Carcinoma of right breast metastatic to axillary lymph node, s/p right mastectomy with implant reconstruction with Dr. Dennis then s/p right lymph node dissection in 2006. Pathology: +8.5cm ILCA, +14/17 LN, -margins, ER+, [place]+, Her2 - R Stage IIIC (T3N3M0) ILCA. She is s/p chemo and RT ([facility]) under the care of Dr. Reis and completed 5 years of endocrine therapy. In 2/25/19, she underwent breast reconstruction by Dr. Rebollar.  MRI brain (4/4/25) - No evidence of intraparenchymal metastasis. Few nonspecific sub centimeter enhancements in the calvarium. Osseous metastases not definitely excluded.   PET CT (05/20/25):  1.  Innumerable bone metastases, mostly involving the ribs, spine and pelvis, but also including the scapulae and the proximal femurs.  There was relatively little correlative abnormality on the accompanying CT scan although some lesions appeared to be lytic.  2.  Enlargement of the spleen compared to the prior study.  The overall size of the spleen is near the upper limit of normal.  Ms. Park underwent biopsy of the left pubic symphysis on 6/5/25, pathology revealed: A. LEFT PUBIC SYMPHYSIS: -Metastatic carcinoma, compatible with known breast primary -Estrogen Receptor (ER): Positive (80%, moderate intensity) -Progesterone Receptor (PgR): Positive (60%, moderate intensity) -HER-2 Negative (Score 0) -Ki67 positive staining nuclei: 30-40%  Ms. Park reports right sided rib pain, rating it an 8 / 10 on the pain scale today.  On average, pain is approximately 1 / 10 on the pain scale. She reports pain intensifies with daily activity and noted that she had spent the prior day gardening. She also reports sacral discomfort that is relieved with Advil.   Ms. Park was started on Faslodex, Kisqali, and soon she will start Xgeva pending dental clearance.   Ms. Park is present to discuss palliative radiation therapy.

## 2025-07-07 NOTE — DISEASE MANAGEMENT
[Clinical] : TNM Stage: c [IV] : IV [FreeTextEntry4] : Metastatic breast cancer with osseous metastasis [TTNM] : X [NTNM] : X [MTNM] : 1

## 2025-07-07 NOTE — LETTER CLOSING
[Consult Closing:] : Thank you for allowing me to participate in the care of this patient.  If you have any questions, please do not hesitate to contact me. [Sincerely yours,] : Sincerely yours, [FreeTextEntry3] : Shruthi Cabrera MD

## 2025-07-09 NOTE — REASON FOR VISIT
Monitor cataract for progression. [Initial Consultation] : an initial consultation [Follow-Up Visit] : a follow-up [FreeTextEntry2] : breast cancer hx

## 2025-07-09 NOTE — PHYSICAL EXAM
[Fully active, able to carry on all pre-disease performance without restriction] : Status 0 - Fully active, able to carry on all pre-disease performance without restriction [Normal] : affect appropriate [de-identified] : no masses or adenopathy palpated bilaterally

## 2025-07-09 NOTE — PHYSICAL EXAM
[Fully active, able to carry on all pre-disease performance without restriction] : Status 0 - Fully active, able to carry on all pre-disease performance without restriction [Normal] : affect appropriate [de-identified] : no masses or adenopathy palpated bilaterally

## 2025-07-09 NOTE — HISTORY OF PRESENT ILLNESS
[de-identified] :  Ms. Park is a 64 year old female with a PMHx of anxiety and depression that presents for consultation referred by breast surgery for hx of breast cancer 18 years ago.   Oncological History:   2006 s/p R mastectomy with implant reconstruction with Dr. Dennis +8.5cm ILCA, + LN, -margins, ER+, [place]+, Her2 - R Stage IIIC (T3N3M0) ILCA  s/p chemo and RT followed by Dr. Reis. Following completion of chemo/RT she completed 5 years of endocrine therapy with Tamoxifen and then transitioned to Letrozole and remains on.  s/p Myriad genetic testing  negative has not had more updated testing   S/P R LD (19)([place])  +recent elevated tumor markers  Ca15.3 - 24 --> 37.4 --> 70.9  Ca 27.29 74--> 91.2--> 170.8 Alk p 120 AST 43  2025 [facility] CTC HER2 report revealing 4.68% CTCs HER2 Pos (3+), 79.1 CTCs HER 2 Low (1+)  - results consistent with HER2 positive   MRI brain 2025 no evidence of intraparenchymal mets. No accute intracranial hemorrhage or acute infarct. Few scattered nonspecific subcentimeter enhancements in the calvarium. Given hx of breast cancer osseous mets not definitely excluded. Consider bone scan for further eval an short term follow up MRI exam recommended to ensure stability.  Health Maintenance:  Colonoscopy  and most recent  with Dr. Alicia  PAP/Pelvic  Last Bone Densitometry  +osteopenia L Mammo/L Sono 24 CT C/A/P 3/14/24 +3cm R apical opacity c/w scar PET 24+R apical opacity w/o uptake  Breast Cancer Risk Factors: Menarche: 13  Date of LMP: 46  Menopause: 46  Age at first live birth: 29 Nursed: n Hysterectomy: n Oophorectomy: n OCP: y HRT: n Last pap/pelvic exam:  Related family history no fam hx of cancer  Ashkenazi: n BRCA testin negative  [de-identified] : Patient is here for follow up for the hx of breast cancer. She overall feels well with no new issues. Still has minor rib pain and intermittent palpitations   s/p biopsy revealing ER/[place]+ HER2- metastatic disease to bones started ribociclib. continues to receive fulvestrant in off week of ribociclib

## 2025-07-09 NOTE — REVIEW OF SYSTEMS
[Diarrhea: Grade 0] : Diarrhea: Grade 0 [Negative] : Allergic/Immunologic [Palpitations] : palpitations [FreeTextEntry9] : minor rib pain: improved

## 2025-07-09 NOTE — CONSULT LETTER
[Dear  ___] : Dear  [unfilled], [Consult Letter:] : I had the pleasure of evaluating your patient, [unfilled]. [Please see my note below.] : Please see my note below. [Consult Closing:] : Thank you very much for allowing me to participate in the care of this patient.  If you have any questions, please do not hesitate to contact me. [Sincerely,] : Sincerely, [FreeTextEntry3] : Yumiko Hall DO, FACGRISEL, FACP Medical Oncology and Hematology [facility]  [address]

## 2025-07-09 NOTE — CONSULT LETTER
[Dear  ___] : Dear  [unfilled], [Consult Letter:] : I had the pleasure of evaluating your patient, [unfilled]. [Please see my note below.] : Please see my note below. [Consult Closing:] : Thank you very much for allowing me to participate in the care of this patient.  If you have any questions, please do not hesitate to contact me. [Sincerely,] : Sincerely, [FreeTextEntry3] : Yumiko Hall DO, FACGRISEL, FACP Medical Oncology and Hematology [facility]

## 2025-07-09 NOTE — ASSESSMENT
[FreeTextEntry1] : # +8.5cm ILCA, +14/17 LN, -margins, ER+, [place]+, Her2 - R Stage IIIC (T3N3M0) ILCA s/p R mastectomy with implant reconstruction with Dr. Dennis then s/p R LD 2/25/19 by Dr. Rebollar s/p chemo and RT followed by Dr. Reis.  Following completion of chemo/RT she completed 5 years of endocrine therapy with Tamoxifen and then transitioned to Letrozole and was on that since. L Mammo/L Sono (5/30/24) MRI brain negative for intracranial mets but ? calvarial lesions tumor markers rising - no recent imaging.  PET CT - Innumerable bone metastases, mostly involving the ribs, spine and pelvis, but also including the scapulae and the proximal femurs.  There is relatively little correlative abnormality on the accompanying CT scan although some lesions appear to be lytic. plan for ribociclib and fulvestrant as she was previously on letrozole. also will need xgeva reviewed side effect profile of all medications. she expresses understanding.  dental clearance prior to xgeva. she is seeing them on 6/30 + ESR1 mutation and PI3KCA  If POD can try PI3KCA inbitor with alpelisib or capivasertib + fulvestrant or elacestrant to target ESR1 spoke with Dr. Reis to update him on case. She will be seeing him for his opinion today She is in her off week of ribociclib - will come back for labs to ensure that she can resume due to neutropenia. proceed with fulvestrant and xgeva  #neutropenia - in off week. will recheck in 1 week Grade 3 ( to <1,000/mm3) - Interrupt ribociclib treatment until recovery to grade 2 or lower and then resume ribociclib at the same dose. For recurrent grade 3 neutropenia, interrupt treatment until recovery and then resume ribociclib at the next lower dose level.  #esophagitis/ reflux needs repeat EGD Cont. PPI  #osteopenia/ bone mets continue ca++ and vit D continue weight bearing exercise limit alcohol maintain healthy BMI 3/2025 - Femoral neck T score -1.9, Total hip t score -0.5, L spine T score -1.3 will be on xgeva for bone mets  #cancer related pain will have RT evaluate for radiation to ribs continue pain control as needed  RTC in 1 week lab only  cbc with diff, cmp, iron, ferritin, b12, folate, esr, crp, LDH 4 weeks for treatment cbc with diff, cmp, iron, ferritin, b12, folate, esr, crp, LDH every 3 motnhs - signaterra

## 2025-07-09 NOTE — ASSESSMENT
[FreeTextEntry1] : # +8.5cm ILCA, +14/17 LN, -margins, ER+, [place]+, Her2 - R Stage IIIC (T3N3M0) ILCA s/p R mastectomy with implant reconstruction with Dr. Dennis then s/p R LD 2/25/19 by Dr. Rebollar s/p chemo and RT followed by Dr. Reis.  Following completion of chemo/RT she completed 5 years of endocrine therapy with Tamoxifen and then transitioned to Letrozole and was on that since. L Mammo/L Sono (5/30/24) MRI brain negative for intracranial mets but ? calvarial lesions tumor markers rising - no recent imaging.  PET CT - Innumerable bone metastases, mostly involving the ribs, spine and pelvis, but also including the scapulae and the proximal femurs.  There is relatively little correlative abnormality on the accompanying CT scan although some lesions appear to be lytic. plan for ribociclib and fulvestrant as she was previously on letrozole. also will need xgeva reviewed side effect profile of all medications. she expresses understanding.  dental clearance prior to xgeva. she is seeing them on 6/30 + ESR1 mutation and PI3KCA  If POD can try PI3KCA inbitor with alpelisib or capivasertib + fulvestrant or elacestrant to target ESR1 spoke with Dr. Reis to update him on case. She will be seeing him for his opinion today She is in her off week of ribociclib - will come back for labs to ensure that she can resume due to neutropenia. proceed with fulvestrant and xgeva  #neutropenia - in off week. will recheck in 1 week Grade 3 ( to <1,000/mm3) - Interrupt ribociclib treatment until recovery to grade 2 or lower and then resume ribociclib at the same dose. For recurrent grade 3 neutropenia, interrupt treatment until recovery and then resume ribociclib at the next lower dose level.  #esophagitis/ reflux needs repeat EGD Cont. PPI  #osteopenia/ bone mets continue ca++ and vit D continue weight bearing exercise limit alcohol maintain healthy BMI 3/2025 - Femoral neck T score -1.9, Total hip t score -0.5, L spine T score -1.3 will be on xgeva for bone mets  #cancer related pain will have RT evaluate for radiation to ribs continue pain control as needed  RTC in 1 week lab only  cbc with diff, cmp, iron, ferritin, b12, folate, esr, crp, LDH 4 weeks for treatment cbc with diff, cmp, iron, ferritin, b12, folate, esr, crp, LDH every 3 motnhs - signaterra

## 2025-07-09 NOTE — HISTORY OF PRESENT ILLNESS
[de-identified] :  Ms. Park is a 64 year old female with a PMHx of anxiety and depression that presents for consultation referred by breast surgery for hx of breast cancer 18 years ago.   Oncological History:   2006 s/p R mastectomy with implant reconstruction with Dr. Dennis +8.5cm ILCA, + LN, -margins, ER+, [place]+, Her2 - R Stage IIIC (T3N3M0) ILCA  s/p chemo and RT followed by Dr. Reis. Following completion of chemo/RT she completed 5 years of endocrine therapy with Tamoxifen and then transitioned to Letrozole and remains on.  s/p Myriad genetic testing  negative has not had more updated testing   S/P R LD (19)([place])  +recent elevated tumor markers  Ca15.3 - 24 --> 37.4 --> 70.9  Ca 27.29 74--> 91.2--> 170.8 Alk p 120 AST 43  2025 [facility] CTC HER2 report revealing 4.68% CTCs HER2 Pos (3+), 79.1 CTCs HER 2 Low (1+)  - results consistent with HER2 positive   MRI brain 2025 no evidence of intraparenchymal mets. No accute intracranial hemorrhage or acute infarct. Few scattered nonspecific subcentimeter enhancements in the calvarium. Given hx of breast cancer osseous mets not definitely excluded. Consider bone scan for further eval an short term follow up MRI exam recommended to ensure stability.  Health Maintenance:  Colonoscopy  and most recent  with Dr. Alicia  PAP/Pelvic  Last Bone Densitometry  +osteopenia L Mammo/L Sono 24 CT C/A/P 3/14/24 +3cm R apical opacity c/w scar PET 24+R apical opacity w/o uptake  Breast Cancer Risk Factors: Menarche: 13  Date of LMP: 46  Menopause: 46  Age at first live birth: 29 Nursed: n Hysterectomy: n Oophorectomy: n OCP: y HRT: n Last pap/pelvic exam:  Related family history no fam hx of cancer  Ashkenazi: n BRCA testin negative  [de-identified] : Patient is here for follow up for the hx of breast cancer. She overall feels well with no new issues. Still has minor rib pain and intermittent palpitations   s/p biopsy revealing ER/[place]+ HER2- metastatic disease to bones started ribociclib. continues to receive fulvestrant in off week of ribociclib

## 2025-07-25 NOTE — HISTORY OF PRESENT ILLNESS
[FreeTextEntry1] : Ms. Park is a 64 year old female, diagnosed with metastatic breast cancer, presenting with osseous metastasis.   She completed radiation therapy today to sacrum, left hip and right rib (800 cGy) to a total of 2000 cGy. She reports less intense pain to the treated areas. She offers no new symptoms of complaints at this time. She will follow-up in 1 month for her PTE.

## 2025-07-25 NOTE — PHYSICAL EXAM
[Normal] : no joint swelling, no clubbing or cyanosis of the fingernails and muscle strength and tone were normal [de-identified] : s/p right MRM 2006, s/p right reconstruction 2019